# Patient Record
Sex: MALE | Race: WHITE | Employment: OTHER | URBAN - METROPOLITAN AREA
[De-identification: names, ages, dates, MRNs, and addresses within clinical notes are randomized per-mention and may not be internally consistent; named-entity substitution may affect disease eponyms.]

---

## 2017-10-18 PROBLEM — H25.12 NUCLEAR SCLEROSIS, LEFT: Status: ACTIVE | Noted: 2017-10-18

## 2017-10-19 ENCOUNTER — HOSPITAL ENCOUNTER (OUTPATIENT)
Age: 67
Setting detail: OUTPATIENT SURGERY
Discharge: HOME | End: 2017-10-19
Attending: OPHTHALMOLOGY | Admitting: OPHTHALMOLOGY
Payer: MEDICARE

## 2017-10-19 ENCOUNTER — ANESTHESIA (OUTPATIENT)
Dept: SURGERY | Age: 67
End: 2017-10-19
Payer: MEDICARE

## 2017-10-19 ENCOUNTER — ANESTHESIA EVENT (OUTPATIENT)
Dept: SURGERY | Age: 67
End: 2017-10-19
Payer: MEDICARE

## 2017-10-19 VITALS
HEIGHT: 67 IN | HEART RATE: 61 BPM | RESPIRATION RATE: 12 BRPM | OXYGEN SATURATION: 100 % | BODY MASS INDEX: 24.87 KG/M2 | SYSTOLIC BLOOD PRESSURE: 125 MMHG | WEIGHT: 158.44 LBS | DIASTOLIC BLOOD PRESSURE: 62 MMHG | TEMPERATURE: 97.4 F

## 2017-10-19 PROCEDURE — 77030013490 HC CYSTM IRR BD -A: Performed by: OPHTHALMOLOGY

## 2017-10-19 PROCEDURE — V2632 POST CHMBR INTRAOCULAR LENS: HCPCS | Performed by: OPHTHALMOLOGY

## 2017-10-19 PROCEDURE — 77030013389: Performed by: OPHTHALMOLOGY

## 2017-10-19 PROCEDURE — 77030006694 HC BLD OPHTH CRESC ALCN -B: Performed by: OPHTHALMOLOGY

## 2017-10-19 PROCEDURE — 74011250636 HC RX REV CODE- 250/636

## 2017-10-19 PROCEDURE — 74011000250 HC RX REV CODE- 250: Performed by: OPHTHALMOLOGY

## 2017-10-19 PROCEDURE — 77030006704 HC BLD OPHTH SLT ALCN -B: Performed by: OPHTHALMOLOGY

## 2017-10-19 PROCEDURE — 77030006692 HC BLD OPHTH BVRGD BD -B: Performed by: OPHTHALMOLOGY

## 2017-10-19 PROCEDURE — 76010000138 HC OR TIME 0.5 TO 1 HR: Performed by: OPHTHALMOLOGY

## 2017-10-19 PROCEDURE — 76060000032 HC ANESTHESIA 0.5 TO 1 HR: Performed by: OPHTHALMOLOGY

## 2017-10-19 PROCEDURE — 76210000021 HC REC RM PH II 0.5 TO 1 HR: Performed by: OPHTHALMOLOGY

## 2017-10-19 PROCEDURE — 77030011291 HC ERAS HEMSTAT BVTC -A: Performed by: OPHTHALMOLOGY

## 2017-10-19 PROCEDURE — 74011250636 HC RX REV CODE- 250/636: Performed by: OPHTHALMOLOGY

## 2017-10-19 DEVICE — IMPLANTABLE DEVICE: Type: IMPLANTABLE DEVICE | Site: EYE | Status: FUNCTIONAL

## 2017-10-19 RX ORDER — SODIUM CHLORIDE 0.9 % (FLUSH) 0.9 %
5-10 SYRINGE (ML) INJECTION AS NEEDED
Status: DISCONTINUED | OUTPATIENT
Start: 2017-10-19 | End: 2017-10-19 | Stop reason: HOSPADM

## 2017-10-19 RX ORDER — OXYCODONE AND ACETAMINOPHEN 5; 325 MG/1; MG/1
1 TABLET ORAL AS NEEDED
Status: DISCONTINUED | OUTPATIENT
Start: 2017-10-19 | End: 2017-10-19 | Stop reason: HOSPADM

## 2017-10-19 RX ORDER — PROPOFOL 10 MG/ML
INJECTION, EMULSION INTRAVENOUS AS NEEDED
Status: DISCONTINUED | OUTPATIENT
Start: 2017-10-19 | End: 2017-10-19 | Stop reason: HOSPADM

## 2017-10-19 RX ORDER — FENTANYL CITRATE 50 UG/ML
INJECTION, SOLUTION INTRAMUSCULAR; INTRAVENOUS AS NEEDED
Status: DISCONTINUED | OUTPATIENT
Start: 2017-10-19 | End: 2017-10-19 | Stop reason: HOSPADM

## 2017-10-19 RX ORDER — CYCLOPENTOLATE HYDROCHLORIDE 10 MG/ML
1 SOLUTION/ DROPS OPHTHALMIC
Status: COMPLETED | OUTPATIENT
Start: 2017-10-19 | End: 2017-10-19

## 2017-10-19 RX ORDER — OFLOXACIN 3 MG/ML
1 SOLUTION/ DROPS OPHTHALMIC 4 TIMES DAILY
COMMUNITY
End: 2019-01-10

## 2017-10-19 RX ORDER — ACETAMINOPHEN 325 MG/1
650 TABLET ORAL
Status: DISCONTINUED | OUTPATIENT
Start: 2017-10-19 | End: 2017-10-19 | Stop reason: HOSPADM

## 2017-10-19 RX ORDER — SODIUM CHLORIDE, SODIUM LACTATE, POTASSIUM CHLORIDE, CALCIUM CHLORIDE 600; 310; 30; 20 MG/100ML; MG/100ML; MG/100ML; MG/100ML
50 INJECTION, SOLUTION INTRAVENOUS CONTINUOUS
Status: DISCONTINUED | OUTPATIENT
Start: 2017-10-19 | End: 2017-10-19 | Stop reason: HOSPADM

## 2017-10-19 RX ORDER — KETOROLAC TROMETHAMINE 5 MG/ML
1 SOLUTION OPHTHALMIC
Status: COMPLETED | OUTPATIENT
Start: 2017-10-19 | End: 2017-10-19

## 2017-10-19 RX ORDER — NEOMYCIN SULFATE, POLYMYXIN B SULFATE, AND DEXAMETHASONE 3.5; 10000; 1 MG/G; [USP'U]/G; MG/G
OINTMENT OPHTHALMIC AS NEEDED
Status: DISCONTINUED | OUTPATIENT
Start: 2017-10-19 | End: 2017-10-19 | Stop reason: HOSPADM

## 2017-10-19 RX ORDER — KETOROLAC TROMETHAMINE 5 MG/ML
1 SOLUTION OPHTHALMIC 4 TIMES DAILY
COMMUNITY
End: 2019-01-10

## 2017-10-19 RX ORDER — PHENYLEPHRINE HYDROCHLORIDE 100 MG/ML
1 SOLUTION/ DROPS OPHTHALMIC
Status: COMPLETED | OUTPATIENT
Start: 2017-10-19 | End: 2017-10-19

## 2017-10-19 RX ORDER — PROPARACAINE HYDROCHLORIDE 5 MG/ML
1 SOLUTION/ DROPS OPHTHALMIC
Status: COMPLETED | OUTPATIENT
Start: 2017-10-19 | End: 2017-10-19

## 2017-10-19 RX ORDER — ONDANSETRON 2 MG/ML
4 INJECTION INTRAMUSCULAR; INTRAVENOUS ONCE
Status: DISCONTINUED | OUTPATIENT
Start: 2017-10-19 | End: 2017-10-19 | Stop reason: HOSPADM

## 2017-10-19 RX ORDER — BUPIVACAINE HYDROCHLORIDE 7.5 MG/ML
INJECTION, SOLUTION EPIDURAL; RETROBULBAR AS NEEDED
Status: DISCONTINUED | OUTPATIENT
Start: 2017-10-19 | End: 2017-10-19 | Stop reason: HOSPADM

## 2017-10-19 RX ORDER — NALOXONE HYDROCHLORIDE 0.4 MG/ML
0.4 INJECTION, SOLUTION INTRAMUSCULAR; INTRAVENOUS; SUBCUTANEOUS AS NEEDED
Status: DISCONTINUED | OUTPATIENT
Start: 2017-10-19 | End: 2017-10-19 | Stop reason: HOSPADM

## 2017-10-19 RX ORDER — SODIUM CHLORIDE, SODIUM LACTATE, POTASSIUM CHLORIDE, CALCIUM CHLORIDE 600; 310; 30; 20 MG/100ML; MG/100ML; MG/100ML; MG/100ML
100 INJECTION, SOLUTION INTRAVENOUS CONTINUOUS
Status: DISCONTINUED | OUTPATIENT
Start: 2017-10-19 | End: 2017-10-19 | Stop reason: HOSPADM

## 2017-10-19 RX ORDER — TROPICAMIDE 10 MG/ML
1 SOLUTION/ DROPS OPHTHALMIC
Status: COMPLETED | OUTPATIENT
Start: 2017-10-19 | End: 2017-10-19

## 2017-10-19 RX ORDER — OFLOXACIN 3 MG/ML
1 SOLUTION/ DROPS OPHTHALMIC
Status: COMPLETED | OUTPATIENT
Start: 2017-10-19 | End: 2017-10-19

## 2017-10-19 RX ORDER — MIDAZOLAM HYDROCHLORIDE 1 MG/ML
INJECTION, SOLUTION INTRAMUSCULAR; INTRAVENOUS AS NEEDED
Status: DISCONTINUED | OUTPATIENT
Start: 2017-10-19 | End: 2017-10-19 | Stop reason: HOSPADM

## 2017-10-19 RX ORDER — FLUMAZENIL 0.1 MG/ML
0.2 INJECTION INTRAVENOUS
Status: DISCONTINUED | OUTPATIENT
Start: 2017-10-19 | End: 2017-10-19 | Stop reason: HOSPADM

## 2017-10-19 RX ORDER — LIDOCAINE HYDROCHLORIDE 20 MG/ML
INJECTION, SOLUTION INFILTRATION; PERINEURAL AS NEEDED
Status: DISCONTINUED | OUTPATIENT
Start: 2017-10-19 | End: 2017-10-19 | Stop reason: HOSPADM

## 2017-10-19 RX ORDER — ONDANSETRON 2 MG/ML
INJECTION INTRAMUSCULAR; INTRAVENOUS AS NEEDED
Status: DISCONTINUED | OUTPATIENT
Start: 2017-10-19 | End: 2017-10-19 | Stop reason: HOSPADM

## 2017-10-19 RX ADMIN — ONDANSETRON 4 MG: 2 INJECTION INTRAMUSCULAR; INTRAVENOUS at 08:16

## 2017-10-19 RX ADMIN — KETOROLAC TROMETHAMINE 1 DROP: 5 SOLUTION OPHTHALMIC at 07:38

## 2017-10-19 RX ADMIN — MIDAZOLAM HYDROCHLORIDE 1 MG: 1 INJECTION, SOLUTION INTRAMUSCULAR; INTRAVENOUS at 08:16

## 2017-10-19 RX ADMIN — PROPOFOL 80 MG: 10 INJECTION, EMULSION INTRAVENOUS at 08:20

## 2017-10-19 RX ADMIN — CYCLOPENTOLATE HYDROCHLORIDE 1 DROP: 10 SOLUTION/ DROPS OPHTHALMIC at 07:38

## 2017-10-19 RX ADMIN — FENTANYL CITRATE 50 MCG: 50 INJECTION, SOLUTION INTRAMUSCULAR; INTRAVENOUS at 08:16

## 2017-10-19 RX ADMIN — PROPARACAINE HYDROCHLORIDE 1 DROP: 5 SOLUTION/ DROPS OPHTHALMIC at 07:31

## 2017-10-19 RX ADMIN — SODIUM CHLORIDE, SODIUM LACTATE, POTASSIUM CHLORIDE, AND CALCIUM CHLORIDE 50 ML/HR: 600; 310; 30; 20 INJECTION, SOLUTION INTRAVENOUS at 07:37

## 2017-10-19 RX ADMIN — TROPICAMIDE 1 DROP: 10 SOLUTION/ DROPS OPHTHALMIC at 07:31

## 2017-10-19 RX ADMIN — OFLOXACIN 1 DROP: 3 SOLUTION OPHTHALMIC at 07:38

## 2017-10-19 RX ADMIN — KETOROLAC TROMETHAMINE 1 DROP: 5 SOLUTION OPHTHALMIC at 07:31

## 2017-10-19 RX ADMIN — TROPICAMIDE 1 DROP: 10 SOLUTION/ DROPS OPHTHALMIC at 07:37

## 2017-10-19 RX ADMIN — PHENYLEPHRINE HYDROCHLORIDE 1 DROP: 100 SOLUTION/ DROPS OPHTHALMIC at 07:39

## 2017-10-19 RX ADMIN — PHENYLEPHRINE HYDROCHLORIDE 1 DROP: 100 SOLUTION/ DROPS OPHTHALMIC at 07:31

## 2017-10-19 RX ADMIN — OFLOXACIN 1 DROP: 3 SOLUTION OPHTHALMIC at 07:31

## 2017-10-19 RX ADMIN — CYCLOPENTOLATE HYDROCHLORIDE 1 DROP: 10 SOLUTION/ DROPS OPHTHALMIC at 07:31

## 2017-10-19 RX ADMIN — PROPARACAINE HYDROCHLORIDE 1 DROP: 5 SOLUTION/ DROPS OPHTHALMIC at 07:38

## 2017-10-19 NOTE — IP AVS SNAPSHOT
Ollie García 
 
 
 [address] [facility] [address] Patient: Georgi Morris MRN: [id number] PFF:5/13/5389 You are allergic to the following Allergen Reactions Procaine Hives 30 years ago with novacaine Recent Documentation Height Weight BMI Smoking Status 1.702 m 71.9 kg 24.81 kg/m2 Former Smoker Emergency Contacts Name Discharge Info Relation Home Work Mobile [address] CAREGIVER [3] Spouse [3] [phone] About your hospitalization You were admitted on:  October 19, 2017 You last received care in the:  [facility] PHASE 2 RECOVERY You were discharged on:  October 19, 2017 Unit phone number:    
  
Why you were hospitalized Your primary diagnosis was:  Nuclear Sclerosis, Left Providers Seen During Your Hospitalizations Provider Role Specialty Primary office phone Saleem Whatley MD Attending Provider Ophthalmology [phone] Your Primary Care Physician (PCP) Primary Care Physician Office Phone Office Fax Franklin Egan [phone] Follow-up Information Follow up With Details Comments Contact Info MD Andres Martell [address] [facility] [address] 
[phone] Current Discharge Medication List  
  
CONTINUE these medications which have NOT CHANGED Dose & Instructions Dispensing Information Comments Morning Noon Evening Bedtime  
 aspirin delayed-release 81 mg tablet Your last dose was: Your next dose is:    
   
   
 Dose:  81 mg Take 81 mg by mouth daily. Refills:  0  
     
   
   
   
  
 FLONASE 50 mcg/actuation nasal spray Generic drug:  fluticasone Your last dose was: Your next dose is:    
   
   
 Dose:  1 Spray 1 [place] by Both Nostrils route daily. Refills:  0  
     
   
   
   
  
 ketorolac 0.5 % ophthalmic solution Commonly known as:  Juan Jose Son Your last dose was: Your next dose is:    
   
   
 Dose:  1 Drop Administer 1 Drop to left eye four (4) times daily. Refills:  0  
     
   
   
   
  
 ofloxacin 0.3 % ophthalmic solution Commonly known as:  FLOXIN Your last dose was: Your next dose is:    
   
   
 Dose:  1 Drop Administer 1 Drop to left eye four (4) times daily. Refills:  0 PRED FORTE 1 % ophthalmic suspension Generic drug:  prednisoLONE acetate Your last dose was: Your next dose is:    
   
   
 Dose:  1 Drop Administer 1 Drop to both eyes daily. Refills:  0 PROSCAR 5 mg tablet Generic drug:  finasteride Your last dose was: Your next dose is:    
   
   
 Dose:  5 mg Take 5 mg by mouth every evening. Indications: Symptomatic Benign Prostatic Hyperplasia Refills:  0 PROzac 20 mg capsule Generic drug:  FLUoxetine Your last dose was: Your next dose is:    
   
   
 Dose:  20 mg Take 20 mg by mouth daily. Indications: ANXIETY WITH DEPRESSION Refills:  0  
     
   
   
   
  
 timolol 0.5 % ophthalmic solution Commonly known as:  TIMOPTIC Your last dose was: Your next dose is:    
   
   
 Dose:  1 Drop Administer 1 Drop to left eye daily. Refills:  0 Discharge Instructions DISCHARGE SUMMARY from Nurse The following personal items are in your possession at time of discharge: 
 
Dental Appliances: None Visual Aid: None Home Medications: None Jewelry: None Clothing: Pants, Shirt, Undergarments, Footwear, Jacket/Coat (locker 3) Other Valuables: Eyeglasses, Cell Phone, [employer] Later (with spouse) PATIENT INSTRUCTIONS: 
 
 
F-face looks uneven A-arms unable to move or move unevenly S-speech slurred or non-existent T-time-call 911 as soon as signs and symptoms begin-DO NOT go Back to bed or wait to see if you get better-TIME IS BRAIN. Warning Signs of HEART ATTACK Call 911 if you have these symptoms: 
? Chest discomfort. Most heart attacks involve discomfort in the center of the chest that lasts more than a few minutes, or that goes away and comes back. It can feel like uncomfortable pressure, squeezing, fullness, or pain. ? Discomfort in other areas of the upper body. Symptoms can include pain or discomfort in one or both arms, the back, neck, jaw, or stomach. ? Shortness of breath with or without chest discomfort. ? Other signs may include breaking out in a cold sweat, nausea, or lightheadedness. Don't wait more than five minutes to call [address]! Fast action can save your life. Calling 911 is almost always the fastest way to get lifesaving treatment. Emergency Medical Services staff can begin treatment when they arrive  up to an hour sooner than if someone gets to the hospital by car. The discharge information has been reviewed with the patient and spouse. The patient and spouse verbalized understanding. Discharge medications reviewed with the patient and spouse and appropriate educational materials and side effects teaching were provided. [facility]. Ophthalmology      Name: Selin Eldridge Cea, MD      : 1950 Post Op Instructions Phone: [phone] Diet 1. Resume usual diet. 2. Do not drink alcohol beverages, including beer, for 24 hours. Activity 1. Do not drive a car or operate any hazardous machinery the day of surgery. 2. You may resume normal activities as tolerated. 3. No bending or heavy lifting 4. No reading. You may watch TV. 
5. Bring your eyedrops with you to your appt on Friday, 10/20/17 @ 8:40 am in [facility]. Wound Care 1. Anticipate that your eye will tear and water. 2. You may also experience a sensation that something is in the eye, like sand or grit but this is normal. 
3. Do not touch the affected eye. 4. Do not remove the eye shield until directed to do so by your physician. Medications 1. Take Tylenol Extra Strength 2 tablets by mouth upon arrival home and then every 4 hours as needed for discomfort. 2. Regarding eye drops: 
1. In most cases, you will not begin using your eye drops in the surgical eye until the day after surgery. Dr. Marilyn Jessica will go over all the medications with you at your first [place] [address] visit. 2. If your eye is NOT patched after surgery, you will begin using your drops right away. Specific instructions will be given to you if this is the case. 3. BRING ALL YOUR EYEDROPS TO YOUR APPOINTMENT. Notify your physician IMMEDIATELY for any of the followin. Excessive pain not relieved by Tylenol, especially headache or increasing pressure in the Operative eye. 2. Persistent nausea lasting more than 8 hours. 3. If any vomiting occurs. If any questions or concerns arise, call your surgeon at [phone] I have received a verbal explanation and a written copy of the above instructions. I am satisfied that all my questions have been answered and I understand these instructions. _____________________________ _________ ____________________ _________ Signature of Guardian/Parent  Date  Discharging Nurse  Date Patient armband removed and shredded Discharge Orders None Introducing [facility]! [facility] introduces [employer] patient portal. Now you can access parts of your medical record, email your doctor's office, and request medication refills online. 1. In your internet browser, go to [email or web address]

## 2017-10-19 NOTE — ANESTHESIA POSTPROCEDURE EVALUATION
Post-Anesthesia Evaluation and Assessment    Patient: Selin Hoyt MRN: [id number]  SSN: [id number]    YOB: 1950  Age: 69 y.o. Sex: male       Cardiovascular Function/Vital Signs  Visit Vitals    /65 (BP 1 Location: Right arm, BP Patient Position: At rest)    Pulse 69    Temp 36.3 °C (97.4 °F)    Resp 12    Ht 5' 7\" (1.702 m)    Wt 71.9 kg (158 lb 7 oz)    SpO2 97%    BMI 24.81 kg/m2       Patient is status post MAC anesthesia for Procedure(s):  CATARACT EXTRACTION WITH INTRA OCULAR LENS IMPLANT- LEFT EYE. Nausea/Vomiting: None    Postoperative hydration reviewed and adequate. Pain:  Pain Scale 1: Numeric (0 - 10) (10/19/17 0856)  Pain Intensity 1: 0 (10/19/17 0856)   Managed    Neurological Status:   Neuro (WDL): Within Defined Limits (10/19/17 0719)   At baseline    Mental Status and Level of Consciousness: Alert and oriented     Pulmonary Status:   O2 Device: Room air (10/19/17 0856)   Adequate oxygenation and airway patent    Complications related to anesthesia: None    Post-anesthesia assessment completed.  No concerns    Signed By: Kirssy Stout CRNA     October 19, 2017

## 2017-10-19 NOTE — DISCHARGE INSTRUCTIONS
DISCHARGE SUMMARY from Nurse    The following personal items are in your possession at time of discharge:    Dental Appliances: None  Visual Aid: None     Home Medications: None  Jewelry: None  Clothing: Pants, Shirt, Undergarments, Footwear, Jacket/Coat (locker 3)  Other Valuables: Eyeglasses, Cell Phone, Martha Risk (with spouse)             PATIENT INSTRUCTIONS:    After general anesthesia or intravenous sedation, for 24 hours or while taking prescription Narcotics:  · Limit your activities  · Do not drive and operate hazardous machinery  · Do not make important personal or business decisions  · Do  not drink alcoholic beverages  · If you have not urinated within 8 hours after discharge, please contact your surgeon on call. Report the following to your surgeon:  · Excessive pain, swelling, redness or odor of or around the surgical area  · Temperature over 100.5  · Nausea and vomiting lasting longer than 4 hours or if unable to take medications  · Any signs of decreased circulation or nerve impairment to extremity: change in color, persistent  numbness, tingling, coldness or increase pain  · Any questions        What to do at Home:  Regular diet  Return to office on Friday as scheduled     If you experience any of the following symptoms bleeding, nausea, severe pain, please follow up with dr Mark Moser      *  Please give a list of your current medications to your Primary Care Provider. *  Please update this list whenever your medications are discontinued, doses are      changed, or new medications (including over-the-counter products) are added. *  Please carry medication information at all times in case of emergency situations. These are general instructions for a healthy lifestyle:    No smoking/ No tobacco products/ Avoid exposure to second hand smoke    Surgeon General's Warning:  Quitting smoking now greatly reduces serious risk to your health.     Obesity, smoking, and sedentary lifestyle greatly increases your risk for illness    A healthy diet, regular physical exercise & weight monitoring are important for maintaining a healthy lifestyle    You may be retaining fluid if you have a history of heart failure or if you experience any of the following symptoms:  Weight gain of 3 pounds or more overnight or 5 pounds in a week, increased swelling in our hands or feet or shortness of breath while lying flat in bed. Please call your doctor as soon as you notice any of these symptoms; do not wait until your next office visit. Recognize signs and symptoms of STROKE:    F-face looks uneven    A-arms unable to move or move unevenly    S-speech slurred or non-existent    T-time-call 911 as soon as signs and symptoms begin-DO NOT go       Back to bed or wait to see if you get better-TIME IS BRAIN. Warning Signs of HEART ATTACK     Call 911 if you have these symptoms:   Chest discomfort. Most heart attacks involve discomfort in the center of the chest that lasts more than a few minutes, or that goes away and comes back. It can feel like uncomfortable pressure, squeezing, fullness, or pain.  Discomfort in other areas of the upper body. Symptoms can include pain or discomfort in one or both arms, the back, neck, jaw, or stomach.  Shortness of breath with or without chest discomfort.  Other signs may include breaking out in a cold sweat, nausea, or lightheadedness. Don't wait more than five minutes to call 911 - MINUTES MATTER! Fast action can save your life. Calling 911 is almost always the fastest way to get lifesaving treatment. Emergency Medical Services staff can begin treatment when they arrive -- up to an hour sooner than if someone gets to the hospital by car. The discharge information has been reviewed with the patient and spouse. The patient and spouse verbalized understanding.     Discharge medications reviewed with the patient and spouse and appropriate educational materials and side effects teaching were provided. [facility]      Name: Therese Luz MD      : 1950  Post Op Instructions  Phone: [phone]    Diet  1. Resume usual diet. 2. Do not drink alcohol beverages, including beer, for 24 hours. Activity  1. Do not drive a car or operate any hazardous machinery the day of surgery. 2. You may resume normal activities as tolerated. 3. No bending or heavy lifting  4. No reading. You may watch TV.  5. Bring your eyedrops with you to your appt on Friday, 10/20/17 @ 8:40 am in [address] [place] [address]. Wound Care  1. Anticipate that your eye will tear and water. 2. You may also experience a sensation that something is in the eye, like sand or grit but this is normal.  3. Do not touch the affected eye. 4. Do not remove the eye shield until directed to do so by your physician. Medications  1. Take Tylenol Extra Strength 2 tablets by mouth upon arrival home and then every 4 hours as needed for discomfort. 2. Regarding eye drops:  1. In most cases, you will not begin using your eye drops in the surgical eye until the day after surgery. Dr. Guanaco Leung will go over all the medications with you at your first [place] [address] visit. 2. If your eye is NOT patched after surgery, you will begin using your drops right away. Specific instructions will be given to you if this is the case. 3. BRING ALL YOUR EYEDROPS TO YOUR APPOINTMENT. Notify your physician IMMEDIATELY for any of the followin. Excessive pain not relieved by Tylenol, especially headache or increasing pressure in the Operative eye. 2. Persistent nausea lasting more than 8 hours. 3. If any vomiting occurs. If any questions or concerns arise, call your surgeon at [phone]    I have received a verbal explanation and a written copy of the above instructions.   I am satisfied that all my questions have been answered and I understand these instructions.       _____________________________ _________ ____________________ _________  Signature of Guardian/Parent  Date  Discharging Nurse  Date    Patient armband removed and shredded

## 2017-10-19 NOTE — H&P
Date of Surgery Update:  Anastasia Encarnacion was seen and examined. History and physical has been reviewed. The patient has been examined.  There have been no significant clinical changes since the completion of the originally dated History and Physical.    Signed By: Yessenia Draper MD     October 19, 2017 8:06 AM

## 2017-10-19 NOTE — ANESTHESIA PREPROCEDURE EVALUATION
Anesthetic History   No history of anesthetic complications            Review of Systems / Medical History  Patient summary reviewed, nursing notes reviewed and pertinent labs reviewed    Pulmonary              Pertinent negatives: No COPD, asthma, recent URI and sleep apnea  Comments: Former smoker   Neuro/Psych         Psychiatric history  Pertinent negatives: No seizures, neuromuscular disease, TIA and CVA   Cardiovascular  Within defined limits                Exercise tolerance: >4 METS     GI/Hepatic/Renal  Within defined limits              Endo/Other          Pertinent negatives: No diabetes, hypothyroidism, hyperthyroidism, obesity and blood dyscrasia   Other Findings   Comments: Waldenstrom's macroglobulinemia           Physical Exam    Airway  Mallampati: II  TM Distance: 4 - 6 cm  Neck ROM: normal range of motion   Mouth opening: Normal     Cardiovascular  Regular rate and rhythm,  S1 and S2 normal,  no murmur, click, rub, or gallop             Dental  No notable dental hx       Pulmonary  Breath sounds clear to auscultation               Abdominal  GI exam deferred       Other Findings            Anesthetic Plan    ASA: 2  Anesthesia type: MAC          Induction: Intravenous  Anesthetic plan and risks discussed with: Patient and Spouse

## 2017-10-19 NOTE — OP NOTES
Cataract Surgery Operative Note      Patient:  Chelo Harris       Sex:    male       DOA: 10/19/2017         YOB: 1950     Age:  69 y.o.        LOS:  LOS: 0 days       Preoperative Diagnosis: CATARACT LEFT EYE    Postoperative Diagnosis:  CATARACT LEFT EYE    Surgeon: Surgeon(s) and Role:     * Daniel Wei MD - Primary    Anesthesia:  MAC    Procedure:  Procedure(s):  CATARACT EXTRACTION WITH INTRA OCULAR LENS IMPLANT- LEFT EYE    Fluids:  500 cc LR    Procedure in Detail: The patient was brought to the operating room and given a retrobulbar injection to the left eye consisting of 2% lidocaine plain and 0.75% marcaine. This accomplished good anesthesia and akinesia of the globe. A lid speculum was inserted into the eye and the operating microscope was placed in the appropriate position. Guzman scissors and 0.12 pickups were used to create a superior conjunctival peritomy. Hemostasis of the episcleral bed was accomplished with the microcautery eraser tip. A guarded Bon Wier blade made a groove in the sclera 1-2mm posterior to the blue-gray line. A keratome was used to enter the anterior chamber. Viscoelastic was injected to displace aqueous. A Supersharp blade was used to create a paracentesis tract through clear peripheral cornea at 3:00. The cystitome was inserted into the eye and used to create a rent in the anterior lens capsule. The edge of this was grasped with Utrata forceps and dragged around 360 degrees, creating a circular anterior capsulorhexis. BSS on a cannula was injected under the lip of this to accomplish hydrodelineation and hydrodissection of the lens. The phaco tip was inserted into the eye and used to phacoemulsify and aspirate the entire lens nucleus. The IA tip was then inserted and used to strip residual lens cortex from the capsular bag.   Viscoelastic was used to reform the capsular bag and the foldable intraocular lens on the injector sleeve was inserted into the eye and injected into the capsular bag. It was dialed into appropriate position with the [facility] hook. The IA tip was again inserted and used to remove viscoelastic from the capsular bag around the IOL and from the anterior chamber. Miochol was injected through the paracentesis tract to accomplish pupillary constriction. The wound was tested for patency and no leaks were found. Conjunctivum was reapproximated with cold cautery. The lid speculum was removed, the drapes brought down. Maxitrol ointment was instilled over the eye which was closed with an eye patch followed by a Lizarraga shield. The patient was discharged to the recovery room in good condition. Estimated Blood Loss: < 1 cc                 Implants:   Implant Name Type Inv.  Item Serial No.  Lot No. LRB No. Used Action   LENS DELVIS ASPHERIC 3PC 19.0D -- TECNIS  - [id number]   LENS DELVIS ASPHERIC 3PC 19.0D -- TECNIS  [id number] [employer]   Left 1 Implanted       Specimens: * No specimens in log *            Complications:  None

## 2019-01-15 PROBLEM — H25.041 POSTERIOR SUBCAPSULAR AGE-RELATED CATARACT OF RIGHT EYE: Status: ACTIVE | Noted: 2019-01-15

## 2019-01-15 RX ORDER — SODIUM CHLORIDE 0.9 % (FLUSH) 0.9 %
5-40 SYRINGE (ML) INJECTION EVERY 8 HOURS
Status: CANCELLED | OUTPATIENT
Start: 2019-01-15

## 2019-01-15 RX ORDER — ACETAMINOPHEN 325 MG/1
650 TABLET ORAL
Status: CANCELLED | OUTPATIENT
Start: 2019-01-15

## 2019-01-15 RX ORDER — SODIUM CHLORIDE 0.9 % (FLUSH) 0.9 %
5-40 SYRINGE (ML) INJECTION AS NEEDED
Status: CANCELLED | OUTPATIENT
Start: 2019-01-15

## 2019-01-17 ENCOUNTER — ANESTHESIA (OUTPATIENT)
Dept: SURGERY | Age: 69
End: 2019-01-17
Payer: MEDICARE

## 2019-01-17 ENCOUNTER — ANESTHESIA EVENT (OUTPATIENT)
Dept: SURGERY | Age: 69
End: 2019-01-17
Payer: MEDICARE

## 2019-01-17 ENCOUNTER — HOSPITAL ENCOUNTER (OUTPATIENT)
Age: 69
Setting detail: OUTPATIENT SURGERY
Discharge: HOME | End: 2019-01-17
Attending: OPHTHALMOLOGY | Admitting: OPHTHALMOLOGY
Payer: MEDICARE

## 2019-01-17 VITALS
SYSTOLIC BLOOD PRESSURE: 160 MMHG | OXYGEN SATURATION: 100 % | RESPIRATION RATE: 16 BRPM | DIASTOLIC BLOOD PRESSURE: 76 MMHG | BODY MASS INDEX: 23.63 KG/M2 | TEMPERATURE: 98 F | HEART RATE: 67 BPM | WEIGHT: 150.56 LBS | HEIGHT: 67 IN

## 2019-01-17 PROCEDURE — 76010000138 HC OR TIME 0.5 TO 1 HR: Performed by: OPHTHALMOLOGY

## 2019-01-17 PROCEDURE — 77030006704 HC BLD OPHTH SLT ALCN -B: Performed by: OPHTHALMOLOGY

## 2019-01-17 PROCEDURE — 74011250636 HC RX REV CODE- 250/636

## 2019-01-17 PROCEDURE — 77030032490 HC SLV COMPR SCD KNE COVD -B: Performed by: OPHTHALMOLOGY

## 2019-01-17 PROCEDURE — V2632 POST CHMBR INTRAOCULAR LENS: HCPCS | Performed by: OPHTHALMOLOGY

## 2019-01-17 PROCEDURE — 77030013389: Performed by: OPHTHALMOLOGY

## 2019-01-17 PROCEDURE — 77030006692 HC BLD OPHTH BVRGD BD -B: Performed by: OPHTHALMOLOGY

## 2019-01-17 PROCEDURE — 77030006694 HC BLD OPHTH CRESC ALCN -B: Performed by: OPHTHALMOLOGY

## 2019-01-17 PROCEDURE — 74011250636 HC RX REV CODE- 250/636: Performed by: OPHTHALMOLOGY

## 2019-01-17 PROCEDURE — 74011000250 HC RX REV CODE- 250: Performed by: OPHTHALMOLOGY

## 2019-01-17 PROCEDURE — 76210000021 HC REC RM PH II 0.5 TO 1 HR: Performed by: OPHTHALMOLOGY

## 2019-01-17 PROCEDURE — 77030011291 HC ERAS HEMSTAT BVTC -A: Performed by: OPHTHALMOLOGY

## 2019-01-17 PROCEDURE — 77030031761 HC CYSTOTOM OPHTH IRR SYS BVTC -A: Performed by: OPHTHALMOLOGY

## 2019-01-17 PROCEDURE — 76060000032 HC ANESTHESIA 0.5 TO 1 HR: Performed by: OPHTHALMOLOGY

## 2019-01-17 DEVICE — LENS INTOCU +18.0 DIOPT L13MM DIA6MM 118.7 A CONSTANT SLM-2: Type: IMPLANTABLE DEVICE | Site: EYE | Status: FUNCTIONAL

## 2019-01-17 RX ORDER — KETOROLAC TROMETHAMINE 5 MG/ML
1 SOLUTION OPHTHALMIC
Status: COMPLETED | OUTPATIENT
Start: 2019-01-17 | End: 2019-01-17

## 2019-01-17 RX ORDER — PHENYLEPHRINE HYDROCHLORIDE 100 MG/ML
1 SOLUTION/ DROPS OPHTHALMIC
Status: COMPLETED | OUTPATIENT
Start: 2019-01-17 | End: 2019-01-17

## 2019-01-17 RX ORDER — PROPARACAINE HYDROCHLORIDE 5 MG/ML
1 SOLUTION/ DROPS OPHTHALMIC
Status: COMPLETED | OUTPATIENT
Start: 2019-01-17 | End: 2019-01-17

## 2019-01-17 RX ORDER — LIDOCAINE HYDROCHLORIDE 20 MG/ML
INJECTION, SOLUTION INFILTRATION; PERINEURAL AS NEEDED
Status: DISCONTINUED | OUTPATIENT
Start: 2019-01-17 | End: 2019-01-17 | Stop reason: HOSPADM

## 2019-01-17 RX ORDER — SODIUM CHLORIDE, SODIUM LACTATE, POTASSIUM CHLORIDE, CALCIUM CHLORIDE 600; 310; 30; 20 MG/100ML; MG/100ML; MG/100ML; MG/100ML
125 INJECTION, SOLUTION INTRAVENOUS CONTINUOUS
Status: DISCONTINUED | OUTPATIENT
Start: 2019-01-17 | End: 2019-01-17 | Stop reason: HOSPADM

## 2019-01-17 RX ORDER — BUPIVACAINE HYDROCHLORIDE 7.5 MG/ML
INJECTION, SOLUTION EPIDURAL; RETROBULBAR AS NEEDED
Status: DISCONTINUED | OUTPATIENT
Start: 2019-01-17 | End: 2019-01-17 | Stop reason: HOSPADM

## 2019-01-17 RX ORDER — CYCLOPENTOLATE HYDROCHLORIDE 10 MG/ML
1 SOLUTION/ DROPS OPHTHALMIC
Status: COMPLETED | OUTPATIENT
Start: 2019-01-17 | End: 2019-01-17

## 2019-01-17 RX ORDER — OFLOXACIN 3 MG/ML
1 SOLUTION/ DROPS OPHTHALMIC
Status: COMPLETED | OUTPATIENT
Start: 2019-01-17 | End: 2019-01-17

## 2019-01-17 RX ORDER — LIDOCAINE HYDROCHLORIDE 20 MG/ML
INJECTION, SOLUTION EPIDURAL; INFILTRATION; INTRACAUDAL; PERINEURAL AS NEEDED
Status: DISCONTINUED | OUTPATIENT
Start: 2019-01-17 | End: 2019-01-17 | Stop reason: HOSPADM

## 2019-01-17 RX ORDER — TROPICAMIDE 10 MG/ML
1 SOLUTION/ DROPS OPHTHALMIC
Status: ACTIVE | OUTPATIENT
Start: 2019-01-17 | End: 2019-01-17

## 2019-01-17 RX ORDER — PROPOFOL 10 MG/ML
INJECTION, EMULSION INTRAVENOUS AS NEEDED
Status: DISCONTINUED | OUTPATIENT
Start: 2019-01-17 | End: 2019-01-17 | Stop reason: HOSPADM

## 2019-01-17 RX ORDER — NEOMYCIN SULFATE, POLYMYXIN B SULFATE, AND DEXAMETHASONE 3.5; 10000; 1 MG/G; [USP'U]/G; MG/G
OINTMENT OPHTHALMIC AS NEEDED
Status: DISCONTINUED | OUTPATIENT
Start: 2019-01-17 | End: 2019-01-17 | Stop reason: HOSPADM

## 2019-01-17 RX ADMIN — PHENYLEPHRINE HYDROCHLORIDE 1 DROP: 100 SOLUTION/ DROPS OPHTHALMIC at 09:24

## 2019-01-17 RX ADMIN — PROPOFOL 20 MG: 10 INJECTION, EMULSION INTRAVENOUS at 10:54

## 2019-01-17 RX ADMIN — SODIUM CHLORIDE, SODIUM LACTATE, POTASSIUM CHLORIDE, AND CALCIUM CHLORIDE 125 ML/HR: 600; 310; 30; 20 INJECTION, SOLUTION INTRAVENOUS at 09:42

## 2019-01-17 RX ADMIN — PHENYLEPHRINE HYDROCHLORIDE 1 DROP: 100 SOLUTION/ DROPS OPHTHALMIC at 09:16

## 2019-01-17 RX ADMIN — OFLOXACIN 1 DROP: 3 SOLUTION OPHTHALMIC at 09:23

## 2019-01-17 RX ADMIN — LIDOCAINE HYDROCHLORIDE 20 MG: 20 INJECTION, SOLUTION EPIDURAL; INFILTRATION; INTRACAUDAL; PERINEURAL at 10:54

## 2019-01-17 RX ADMIN — KETOROLAC TROMETHAMINE 1 DROP: 5 SOLUTION OPHTHALMIC at 09:16

## 2019-01-17 RX ADMIN — LIDOCAINE HYDROCHLORIDE 40 MG: 20 INJECTION, SOLUTION EPIDURAL; INFILTRATION; INTRACAUDAL; PERINEURAL at 10:56

## 2019-01-17 RX ADMIN — PROPOFOL 40 MG: 10 INJECTION, EMULSION INTRAVENOUS at 10:56

## 2019-01-17 RX ADMIN — PROPARACAINE HYDROCHLORIDE 1 DROP: 5 SOLUTION/ DROPS OPHTHALMIC at 09:16

## 2019-01-17 RX ADMIN — KETOROLAC TROMETHAMINE 1 DROP: 5 SOLUTION OPHTHALMIC at 09:23

## 2019-01-17 RX ADMIN — CYCLOPENTOLATE HYDROCHLORIDE 1 DROP: 10 SOLUTION/ DROPS OPHTHALMIC at 09:24

## 2019-01-17 RX ADMIN — PROPOFOL 20 MG: 10 INJECTION, EMULSION INTRAVENOUS at 10:58

## 2019-01-17 RX ADMIN — OFLOXACIN 1 DROP: 3 SOLUTION OPHTHALMIC at 09:16

## 2019-01-17 RX ADMIN — CYCLOPENTOLATE HYDROCHLORIDE 1 DROP: 10 SOLUTION/ DROPS OPHTHALMIC at 09:16

## 2019-01-17 RX ADMIN — PROPARACAINE HYDROCHLORIDE 1 DROP: 5 SOLUTION/ DROPS OPHTHALMIC at 09:24

## 2019-01-17 NOTE — ANESTHESIA PREPROCEDURE EVALUATION
Anesthetic History No history of anesthetic complications Pertinent negatives: No PONV Review of Systems / Medical History Patient summary reviewed, nursing notes reviewed and pertinent labs reviewed Pulmonary Pertinent negatives: No COPD, asthma, recent URI, sleep apnea and smoker Comments: Former smoker Neuro/Psych Psychiatric history Pertinent negatives: No seizures, neuromuscular disease, TIA and CVA Cardiovascular Within defined limits Pertinent negatives: No hypertension Exercise tolerance: >4 METS 
  
GI/Hepatic/Renal 
Within defined limits Pertinent negatives: No GERD, liver disease and renal disease Endo/Other Cancer Pertinent negatives: No diabetes, hypothyroidism, hyperthyroidism, obesity and blood dyscrasia Other Findings Comments: Waldenstrom's macroglobulinemia 
 
etoh 7/week Physical Exam 
 
Airway Mallampati: II 
TM Distance: < 4 cm Mouth opening: Normal 
 
 Cardiovascular Regular rate and rhythm,  S1 and S2 normal,  no murmur, click, rub, or gallop Dental 
No notable dental hx Pulmonary Breath sounds clear to auscultation Abdominal 
GI exam deferred Other Findings Anesthetic Plan ASA: 2 Anesthesia type: MAC Induction: Intravenous Anesthetic plan and risks discussed with: Patient and Spouse

## 2019-01-17 NOTE — DISCHARGE INSTRUCTIONS
[facility]      Name: Iftikhar Max MD      : 1950  Post Op Instructions  Phone: [phone]    Diet  1. Resume usual diet. 2. Do not drink alcohol beverages, including beer, for 24 hours. Activity  1. Do not drive a car or operate any hazardous machinery the day of surgery. 2. You may resume normal activities as tolerated. 3. No bending or heavy lifting  4. No reading. You may watch TV.  5. Bring your eyedrops with you to your appt on Friday, 19 @ 8:40 am in [place]. Wound Care  1. Anticipate that your eye will tear and water. 2. You may also experience a sensation that something is in the eye, like sand or grit but this is normal.  3. Do not touch the affected eye. 4. Do not remove the eye shield until directed to do so by your physician. Medications  1. Take Tylenol Extra Strength 2 tablets by mouth upon arrival home and then every 4 hours as needed for discomfort. 2. Regarding eye drops:  1. In most cases, you will not begin using your eye drops in the surgical eye until the day after surgery. Dr. Tiff Brady will go over all the medications with you at your first [place] [id number] visit. 2. If your eye is NOT patched after surgery, you will begin using your drops right away. Specific instructions will be given to you if this is the case. 3. BRING ALL YOUR EYEDROPS TO YOUR APPOINTMENT. Notify your physician IMMEDIATELY for any of the followin. Excessive pain not relieved by Tylenol, especially headache or increasing pressure in the Operative eye. 2. Persistent nausea lasting more than 8 hours. 3. If any vomiting occurs. If any questions or concerns arise, call your surgeon at [phone]    I have received a verbal explanation and a written copy of the above instructions. I am satisfied that all my questions have been answered and I understand these instructions.       _____________________________ _________ ____________________ _________  Signature of Guardian/Parent  Date  Discharging Nurse  Date      DISCHARGE SUMMARY from Nurse    PATIENT INSTRUCTIONS:    After general anesthesia or intravenous sedation, for 24 hours or while taking prescription Narcotics:  · Limit your activities  · Do not drive and operate hazardous machinery  · Do not make important personal or business decisions  · Do  not drink alcoholic beverages  · If you have not urinated within 8 hours after discharge, please contact your surgeon on call. Report the following to your surgeon:  · Excessive pain, swelling, redness or odor of or around the surgical area  · Temperature over 100.5  · Nausea and vomiting lasting longer than 4 hours or if unable to take medications  · Any signs of decreased circulation or nerve impairment to extremity: change in color, persistent  numbness, tingling, coldness or increase pain  · Any questions    What to do at Home:  Recommended activity: Activity as tolerated, Ambulate in house and No heavy lifting, pushing, pulling     If you experience any of the following symptoms above, please follow up with Dr. Marj Najera. *  Please give a list of your current medications to your Primary Care Provider. *  Please update this list whenever your medications are discontinued, doses are      changed, or new medications (including over-the-counter products) are added. *  Please carry medication information at all times in case of emergency situations. These are general instructions for a healthy lifestyle:    No smoking/ No tobacco products/ Avoid exposure to second hand smoke  Surgeon General's Warning:  Quitting smoking now greatly reduces serious risk to your health.     Obesity, smoking, and sedentary lifestyle greatly increases your risk for illness    A healthy diet, regular physical exercise & weight monitoring are important for maintaining a healthy lifestyle    You may be retaining fluid if you have a history of heart failure or if you experience any of the following symptoms:  Weight gain of 3 pounds or more overnight or 5 pounds in a week, increased swelling in our hands or feet or shortness of breath while lying flat in bed. Please call your doctor as soon as you notice any of these symptoms; do not wait until your next office visit. Recognize signs and symptoms of STROKE:    F-face looks uneven    A-arms unable to move or move unevenly    S-speech slurred or non-existent    T-time-call 911 as soon as signs and symptoms begin-DO NOT go       Back to bed or wait to see if you get better-TIME IS BRAIN. Warning Signs of HEART ATTACK     Call 911 if you have these symptoms:   Chest discomfort. Most heart attacks involve discomfort in the center of the chest that lasts more than a few minutes, or that goes away and comes back. It can feel like uncomfortable pressure, squeezing, fullness, or pain.  Discomfort in other areas of the upper body. Symptoms can include pain or discomfort in one or both arms, the back, neck, jaw, or stomach.  Shortness of breath with or without chest discomfort.  Other signs may include breaking out in a cold sweat, nausea, or lightheadedness. Don't wait more than five minutes to call 911 - MINUTES MATTER! Fast action can save your life. Calling 911 is almost always the fastest way to get lifesaving treatment. Emergency Medical Services staff can begin treatment when they arrive -- up to an hour sooner than if someone gets to the hospital by car. Patient armband removed and shredded      The discharge information has been reviewed with the patient and caregiver. The patient and caregiver verbalized understanding. Discharge medications reviewed with the patient and caregiver and appropriate educational materials and side effects teaching were provided.   ___________________________________________________________________________________________________________________________________

## 2019-01-17 NOTE — H&P
Date of Surgery Update: 
Sami Velez was seen and examined. History and physical has been reviewed. The patient has been examined.  There have been no significant clinical changes since the completion of the originally dated History and Physical. 
 
Signed By: Navdeep Carlin MD   
 January 17, 2019 9:46 AM

## 2019-01-17 NOTE — PERIOP NOTES
Reviewed PTA medication list with patient/caregiver and patient/caregiver denies any additional medications. Patient admits to having a responsible adult care for them for at least 24 hours after surgery. 
  
Dual skin assessment completed by Tiffany Milton RN and Parth Brenner RN. Pt denies having an active cough and confirms being able to lie still for 20 minutes.

## 2019-01-17 NOTE — OP NOTES
Cataract Surgery Operative Note      Patient:  Ana Geiger       Sex:    male       DOA: 1/17/2019         YOB: 1950     Age:  77 y.o.        LOS:  LOS: 0 days       Preoperative Diagnosis: CATARACT RIGHT EYE    Postoperative Diagnosis:  CATARACT RIGHT EYE    Surgeon: Surgeon(s) and Role:     Silvestre Mayorga MD - Primary    Anesthesia:  MAC    Procedure:  Procedure(s):  CATARACT EXTRACTION WITH INTRA OCULAR LENS IMPLANT- RIGHT EYE    Fluids:  600 cc LR    Procedure in Detail: The patient was brought to the operating room and given a retrobulbar injection to the right eye consisting of 2% lidocaine plain and 0.75% marcaine. This accomplished good anesthesia and akinesia of the globe. A lid speculum was inserted into the eye and the operating microscope was placed in the appropriate position. Guzman scissors and 0.12 pickups were used to create a superior conjunctival peritomy. Hemostasis of the episcleral bed was accomplished with the microcautery eraser tip. A guarded [place] blade made a groove in the sclera 1-2mm posterior to the blue-gray line. A keratome was used to enter the anterior chamber. Viscoelastic was injected to displace aqueous. A Supersharp blade was used to create a paracentesis tract through clear peripheral cornea at 3:00. The cystitome was inserted into the eye and used to create a rent in the anterior lens capsule. The edge of this was grasped with Utrata forceps and dragged around 360 degrees, creating a circular anterior capsulorhexis. BSS on a cannula was injected under the lip of this to accomplish hydrodelineation and hydrodissection of the lens. The phaco tip was inserted into the eye and used to phacoemulsify and aspirate the entire lens nucleus. The IA tip was then inserted and used to strip residual lens cortex from the capsular bag.   Viscoelastic was used to reform the capsular bag and the foldable intraocular lens on the injector sleeve was inserted into the eye and injected into the capsular bag. It was dialed into appropriate position with the [facility] hook. The IA tip was again inserted and used to remove viscoelastic from the capsular bag around the IOL and from the anterior chamber. Miochol was injected through the paracentesis tract to accomplish pupillary constriction. The wound was tested for patency and no leaks were found. Conjunctivum was reapproximated with cold cautery. The lid speculum was removed, the drapes brought down. Maxitrol ointment was instilled over the eye which was closed with an eye patch followed by a [place] shield. The patient was discharged to the recovery room in good condition. Estimated Blood Loss: < 1 cc                 Implants:   Implant Name Type Inv.  Item Serial No.  Lot No. LRB No. Used Action   LENS DELVIS ASPHERIC 3PC 18.0D -- TECNIS  - [id number] Intraocular Lens LENS DELVIS ASPHERIC 3PC 18.0D -- TECNIS  [id number] [facility] [employer]  Right 1 Implanted       Specimens: * No specimens in log *            Complications:  None

## 2019-01-18 NOTE — ANESTHESIA POSTPROCEDURE EVALUATION
Procedure(s): CATARACT EXTRACTION WITH INTRA OCULAR LENS IMPLANT- RIGHT EYE. Anesthesia Post Evaluation Comments: Post-Anesthesia Evaluation and Assessment Cardiovascular Function/Vital Signs /76 (BP 1 Location: Left arm, BP Patient Position: At rest;Sitting)   Pulse 67   Temp 36.7 °C (98 °F)   Resp 16   Ht 5' 7\" (1.702 m)   Wt 68.3 kg (150 lb 9 oz)   SpO2 100%   BMI 23.58 kg/m² Patient is status post Procedure(s): CATARACT EXTRACTION WITH INTRA OCULAR LENS IMPLANT- RIGHT EYE. Nausea/Vomiting: Controlled. Postoperative hydration reviewed and adequate. Pain: 
Pain Scale 1: Numeric (0 - 10) (01/17/19 1200) Pain Intensity 1: 0 (01/17/19 1200) Managed. Neurological Status:  
Neuro (WDL): Within Defined Limits (01/17/19 1200) At baseline. Mental Status and Level of Consciousness: Arousable. Pulmonary Status:  
O2 Device: Room air (01/17/19 1119) Adequate oxygenation and airway patent. Complications related to anesthesia: None Post-anesthesia assessment completed. No concerns. Patient has met all discharge requirements. Signed By: Binh Burt MD  
 January 17, 2019 Visit Vitals /76 (BP 1 Location: Left arm, BP Patient Position: At rest;Sitting) Pulse 67 Temp 36.7 °C (98 °F) Resp 16 Ht 5' 7\" (1.702 m) Wt 68.3 kg (150 lb 9 oz) SpO2 100% BMI 23.58 kg/m²

## 2019-09-06 NOTE — PERIOP NOTES
POST-OP REPORT CALLED TO Ángel Almeida RN. Cryotherapy Text: The wound bed was treated with cryotherapy after the biopsy was performed. Post-Care Instructions: I reviewed with the patient in detail post-care instructions. Patient is to keep the biopsy site dry overnight, and then apply bacitracin twice daily until healed. Patient may apply hydrogen peroxide soaks to remove any crusting. Destruction After The Procedure: No Lab Facility: [id number] Size Of Lesion In Cm: 0 Wound Care: Petrolatum Anesthesia Type: 1% lidocaine with epinephrine Notification Instructions: Patient will be notified of biopsy results. However, patient instructed to call the office if not contacted within 2 weeks. Electrodesiccation Text: The wound bed was treated with electrodesiccation after the biopsy was performed. Depth Of Biopsy: dermis Biopsy Type: H and E Electrodesiccation And Curettage Text: The wound bed was treated with electrodesiccation and curettage after the biopsy was performed. Type Of Destruction Used: Curettage Consent: Written consent was obtained and risks were reviewed including but not limited to scarring, infection, bleeding, scabbing, incomplete removal, nerve damage and allergy to anesthesia. Dressing: bandage Anesthesia Volume In Cc (Will Not Render If 0): 0.5 Was A Bandage Applied: Yes Detail Level: Detailed Curettage Text: The wound bed was treated with curettage after the biopsy was performed. Hemostasis: Electrocautery Silver Nitrate Text: The wound bed was treated with silver nitrate after the biopsy was performed. Lab: [id number] Billing Type: Third-Party Bill Biopsy Method: Personna blade
